# Patient Record
Sex: FEMALE | Race: WHITE | URBAN - NONMETROPOLITAN AREA
[De-identification: names, ages, dates, MRNs, and addresses within clinical notes are randomized per-mention and may not be internally consistent; named-entity substitution may affect disease eponyms.]

---

## 2017-02-16 ENCOUNTER — DOCTOR'S OFFICE (OUTPATIENT)
Dept: URBAN - NONMETROPOLITAN AREA CLINIC 1 | Facility: CLINIC | Age: 62
Setting detail: OPHTHALMOLOGY
End: 2017-02-16
Payer: COMMERCIAL

## 2017-02-16 DIAGNOSIS — H43.812: ICD-10-CM

## 2017-02-16 DIAGNOSIS — H43.811: ICD-10-CM

## 2017-02-16 DIAGNOSIS — H25.13: ICD-10-CM

## 2017-02-16 DIAGNOSIS — H04.121: ICD-10-CM

## 2017-02-16 DIAGNOSIS — H35.89: ICD-10-CM

## 2017-02-16 DIAGNOSIS — H04.122: ICD-10-CM

## 2017-02-16 DIAGNOSIS — H35.411: ICD-10-CM

## 2017-02-16 PROCEDURE — 92226 OPHTHALMOSCOPY EXT SUBSEQUENT: CPT | Performed by: OPHTHALMOLOGY

## 2017-02-16 PROCEDURE — 83861 MICROFLUID ANALY TEARS: CPT | Performed by: OPHTHALMOLOGY

## 2017-02-16 PROCEDURE — 92014 COMPRE OPH EXAM EST PT 1/>: CPT | Performed by: OPHTHALMOLOGY

## 2017-02-16 ASSESSMENT — VISUAL ACUITY
OS_BCVA: 20/20-2
OD_BCVA: 20/20-1

## 2017-02-16 ASSESSMENT — LID POSITION - DERMATOCHALASIS
OS_DERMATOCHALASIS: 1+
OD_DERMATOCHALASIS: 1+

## 2017-02-16 ASSESSMENT — REFRACTION_CURRENTRX
OS_OVR_VA: 20/
OD_OVR_VA: 20/
OD_OVR_VA: 20/
OS_OVR_VA: 20/
OD_OVR_VA: 20/
OS_OVR_VA: 20/

## 2017-02-16 ASSESSMENT — CONFRONTATIONAL VISUAL FIELD TEST (CVF)
OS_FINDINGS: FULL
OD_FINDINGS: FULL

## 2017-02-16 ASSESSMENT — REFRACTION_MANIFEST
OD_VA2: 20/
OU_VA: 20/
OS_VA2: 20/
OS_VA3: 20/
OS_VA2: 20/
OD_VA1: 20/
OS_VA3: 20/
OS_VA2: 20/
OS_VA1: 20/
OD_VA2: 20/
OU_VA: 20/
OD_VA2: 20/
OS_VA1: 20/
OD_VA3: 20/
OU_VA: 20/
OD_VA3: 20/
OS_VA3: 20/
OD_VA3: 20/
OD_VA1: 20/
OD_VA1: 20/
OS_VA1: 20/

## 2017-02-16 ASSESSMENT — TEAR BREAK UP TIME (TBUT)
OS_TBUT: T 1+
OD_TBUT: T 1+

## 2017-05-18 ENCOUNTER — DOCTOR'S OFFICE (OUTPATIENT)
Dept: URBAN - NONMETROPOLITAN AREA CLINIC 1 | Facility: CLINIC | Age: 62
Setting detail: OPHTHALMOLOGY
End: 2017-05-18
Payer: COMMERCIAL

## 2017-05-18 DIAGNOSIS — H43.812: ICD-10-CM

## 2017-05-18 DIAGNOSIS — H35.89: ICD-10-CM

## 2017-05-18 DIAGNOSIS — H35.411: ICD-10-CM

## 2017-05-18 DIAGNOSIS — H04.121: ICD-10-CM

## 2017-05-18 DIAGNOSIS — H25.13: ICD-10-CM

## 2017-05-18 DIAGNOSIS — H04.122: ICD-10-CM

## 2017-05-18 DIAGNOSIS — H43.811: ICD-10-CM

## 2017-05-18 PROCEDURE — 83861 MICROFLUID ANALY TEARS: CPT | Performed by: OPHTHALMOLOGY

## 2017-05-18 PROCEDURE — 92012 INTRM OPH EXAM EST PATIENT: CPT | Performed by: OPHTHALMOLOGY

## 2017-05-18 ASSESSMENT — TEAR BREAK UP TIME (TBUT)
OS_TBUT: T 1+
OD_TBUT: T 1+

## 2017-05-18 ASSESSMENT — REFRACTION_MANIFEST
OD_VA1: 20/
OS_VA3: 20/
OS_VA2: 20/
OS_VA2: 20/
OU_VA: 20/
OS_VA1: 20/
OU_VA: 20/
OD_VA3: 20/
OD_VA2: 20/
OS_VA2: 20/
OS_VA1: 20/
OS_VA3: 20/
OD_VA2: 20/
OD_VA1: 20/
OD_VA3: 20/
OD_VA1: 20/
OD_VA3: 20/
OS_VA3: 20/
OU_VA: 20/
OD_VA2: 20/
OS_VA1: 20/

## 2017-05-18 ASSESSMENT — REFRACTION_CURRENTRX
OS_OVR_VA: 20/
OD_OVR_VA: 20/
OS_OVR_VA: 20/
OD_OVR_VA: 20/
OD_OVR_VA: 20/
OS_OVR_VA: 20/

## 2017-05-18 ASSESSMENT — CONFRONTATIONAL VISUAL FIELD TEST (CVF)
OD_FINDINGS: FULL
OS_FINDINGS: FULL

## 2017-05-18 ASSESSMENT — VISUAL ACUITY
OS_BCVA: 20/20-2
OD_BCVA: 20/20-2

## 2017-05-18 ASSESSMENT — LID POSITION - DERMATOCHALASIS
OS_DERMATOCHALASIS: 1+
OD_DERMATOCHALASIS: 1+

## 2017-07-24 ENCOUNTER — APPOINTMENT (EMERGENCY)
Dept: RADIOLOGY | Facility: HOSPITAL | Age: 62
End: 2017-07-24
Payer: COMMERCIAL

## 2017-07-24 ENCOUNTER — HOSPITAL ENCOUNTER (EMERGENCY)
Facility: HOSPITAL | Age: 62
Discharge: HOME | End: 2017-07-24
Admitting: EMERGENCY MEDICINE
Payer: COMMERCIAL

## 2017-07-24 VITALS
SYSTOLIC BLOOD PRESSURE: 131 MMHG | DIASTOLIC BLOOD PRESSURE: 70 MMHG | OXYGEN SATURATION: 98 % | TEMPERATURE: 99.9 F | HEIGHT: 63 IN | WEIGHT: 183.19 LBS | HEART RATE: 84 BPM | BODY MASS INDEX: 32.46 KG/M2 | RESPIRATION RATE: 18 BRPM

## 2017-07-24 DIAGNOSIS — S82.892A ANKLE FRACTURE, LEFT: Primary | ICD-10-CM

## 2017-07-24 PROCEDURE — 99283 EMERGENCY DEPT VISIT LOW MDM: CPT

## 2017-07-24 PROCEDURE — 73610 X-RAY EXAM OF ANKLE: CPT

## 2017-07-24 RX ORDER — PHENOL 1.4 %
600 AEROSOL, SPRAY (ML) MUCOUS MEMBRANE DAILY
COMMUNITY

## 2017-07-24 RX ORDER — LANOLIN ALCOHOL/MO/W.PET/CERES
1 CREAM (GRAM) TOPICAL 2 TIMES DAILY
COMMUNITY

## 2017-07-24 RX ORDER — MELOXICAM 7.5 MG/1
7.5 TABLET ORAL DAILY
COMMUNITY

## 2017-07-24 RX ORDER — ASPIRIN 81 MG/1
81 TABLET ORAL DAILY
COMMUNITY

## 2017-07-24 RX ORDER — BIOTIN 5 MG
1000 TABLET ORAL
COMMUNITY

## 2017-07-24 RX ORDER — FOLIC ACID/MULTIVIT,IRON,MINER .4-18-35
1 TABLET,CHEWABLE ORAL DAILY
COMMUNITY

## 2017-09-11 ENCOUNTER — DOCTOR'S OFFICE (OUTPATIENT)
Dept: URBAN - NONMETROPOLITAN AREA CLINIC 1 | Facility: CLINIC | Age: 62
Setting detail: OPHTHALMOLOGY
End: 2017-09-11
Payer: COMMERCIAL

## 2017-09-11 DIAGNOSIS — H25.13: ICD-10-CM

## 2017-09-11 DIAGNOSIS — H35.89: ICD-10-CM

## 2017-09-11 DIAGNOSIS — H04.123: ICD-10-CM

## 2017-09-11 DIAGNOSIS — H43.811: ICD-10-CM

## 2017-09-11 DIAGNOSIS — H04.122: ICD-10-CM

## 2017-09-11 DIAGNOSIS — H43.812: ICD-10-CM

## 2017-09-11 DIAGNOSIS — H04.121: ICD-10-CM

## 2017-09-11 DIAGNOSIS — H43.813: ICD-10-CM

## 2017-09-11 DIAGNOSIS — H35.411: ICD-10-CM

## 2017-09-11 PROCEDURE — 83861 MICROFLUID ANALY TEARS: CPT | Performed by: OPHTHALMOLOGY

## 2017-09-11 PROCEDURE — 92226 OPHTHALMOSCOPY EXT SUBSEQUENT: CPT | Performed by: OPHTHALMOLOGY

## 2017-09-11 PROCEDURE — 92014 COMPRE OPH EXAM EST PT 1/>: CPT | Performed by: OPHTHALMOLOGY

## 2017-09-11 ASSESSMENT — REFRACTION_CURRENTRX
OS_OVR_VA: 20/
OS_OVR_VA: 20/
OD_OVR_VA: 20/
OD_OVR_VA: 20/
OS_OVR_VA: 20/
OD_OVR_VA: 20/

## 2017-09-11 ASSESSMENT — REFRACTION_MANIFEST
OS_VA2: 20/
OS_VA1: 20/
OD_VA3: 20/
OD_VA1: 20/
OS_VA3: 20/
OU_VA: 20/
OD_VA2: 20/
OD_VA2: 20/
OD_VA1: 20/
OS_VA2: 20/
OS_VA1: 20/
OD_VA3: 20/
OD_VA2: 20/
OD_VA3: 20/
OU_VA: 20/
OS_VA3: 20/
OS_VA3: 20/
OS_VA1: 20/
OD_VA1: 20/
OU_VA: 20/
OS_VA2: 20/

## 2017-09-11 ASSESSMENT — TEAR BREAK UP TIME (TBUT)
OS_TBUT: T 1+
OD_TBUT: T 1+

## 2017-09-11 ASSESSMENT — CONFRONTATIONAL VISUAL FIELD TEST (CVF)
OD_FINDINGS: FULL
OS_FINDINGS: FULL

## 2017-09-11 ASSESSMENT — VISUAL ACUITY
OS_BCVA: 20/20-1
OD_BCVA: 20/20-1

## 2017-09-11 ASSESSMENT — LID POSITION - DERMATOCHALASIS
OD_DERMATOCHALASIS: 1+
OS_DERMATOCHALASIS: 1+

## 2018-01-15 ENCOUNTER — DOCTOR'S OFFICE (OUTPATIENT)
Dept: URBAN - NONMETROPOLITAN AREA CLINIC 1 | Facility: CLINIC | Age: 63
Setting detail: OPHTHALMOLOGY
End: 2018-01-15
Payer: COMMERCIAL

## 2018-01-15 DIAGNOSIS — H04.123: ICD-10-CM

## 2018-01-15 DIAGNOSIS — H25.13: ICD-10-CM

## 2018-01-15 DIAGNOSIS — H43.813: ICD-10-CM

## 2018-01-15 DIAGNOSIS — H43.812: ICD-10-CM

## 2018-01-15 DIAGNOSIS — H33.312: ICD-10-CM

## 2018-01-15 DIAGNOSIS — H43.811: ICD-10-CM

## 2018-01-15 DIAGNOSIS — H35.411: ICD-10-CM

## 2018-01-15 PROBLEM — H04.121 DRY EYE; RIGHT EYE, LEFT EYE, BOTH EYES: Status: ACTIVE | Noted: 2018-01-15

## 2018-01-15 PROBLEM — H10.13 ALLERGIC CONJUNCTIVITIS; BOTH EYES: Status: ACTIVE | Noted: 2017-02-16

## 2018-01-15 PROBLEM — H04.122 DRY EYE; RIGHT EYE, LEFT EYE, BOTH EYES: Status: ACTIVE | Noted: 2018-01-15

## 2018-01-15 PROCEDURE — 92226 OPHTHALMOSCOPY EXT SUBSEQUENT: CPT | Performed by: OPHTHALMOLOGY

## 2018-01-15 PROCEDURE — 92134 CPTRZ OPH DX IMG PST SGM RTA: CPT | Performed by: OPHTHALMOLOGY

## 2018-01-15 PROCEDURE — 92014 COMPRE OPH EXAM EST PT 1/>: CPT | Performed by: OPHTHALMOLOGY

## 2018-01-15 ASSESSMENT — REFRACTION_CURRENTRX
OS_OVR_VA: 20/
OD_OVR_VA: 20/

## 2018-01-15 ASSESSMENT — TEAR BREAK UP TIME (TBUT)
OS_TBUT: T 1+
OD_TBUT: T 1+

## 2018-01-15 ASSESSMENT — REFRACTION_MANIFEST
OS_VA2: 20/
OD_VA1: 20/
OS_VA1: 20/
OU_VA: 20/
OD_VA3: 20/
OS_VA3: 20/
OD_VA3: 20/
OS_VA2: 20/
OS_VA1: 20/
OS_VA3: 20/
OD_VA2: 20/
OS_VA2: 20/
OD_VA2: 20/
OD_VA1: 20/
OS_VA3: 20/
OD_VA2: 20/
OD_VA3: 20/
OU_VA: 20/
OU_VA: 20/
OD_VA1: 20/
OS_VA1: 20/

## 2018-01-15 ASSESSMENT — CONFRONTATIONAL VISUAL FIELD TEST (CVF)
OS_FINDINGS: FULL
OD_FINDINGS: FULL

## 2018-01-15 ASSESSMENT — VISUAL ACUITY
OD_BCVA: 20/25-2
OS_BCVA: 20/25-2

## 2018-01-15 ASSESSMENT — LID POSITION - DERMATOCHALASIS
OD_DERMATOCHALASIS: 1+
OS_DERMATOCHALASIS: 1+

## 2023-02-21 ENCOUNTER — OFFICE VISIT (OUTPATIENT)
Dept: AUDIOLOGY | Facility: CLINIC | Age: 68
End: 2023-02-21

## 2023-02-21 DIAGNOSIS — H90.3 SENSORY HEARING LOSS, BILATERAL: Primary | ICD-10-CM
